# Patient Record
Sex: FEMALE | Race: WHITE | Employment: UNEMPLOYED | URBAN - METROPOLITAN AREA
[De-identification: names, ages, dates, MRNs, and addresses within clinical notes are randomized per-mention and may not be internally consistent; named-entity substitution may affect disease eponyms.]

---

## 2017-10-09 ENCOUNTER — HOSPITAL ENCOUNTER (OUTPATIENT)
Dept: PHYSICAL THERAPY | Age: 62
Setting detail: THERAPIES SERIES
Discharge: HOME | End: 2017-10-09
Payer: COMMERCIAL

## 2017-10-09 PROCEDURE — 97162 PT EVAL MOD COMPLEX 30 MIN: CPT

## 2017-10-09 PROCEDURE — G0283 ELEC STIM OTHER THAN WOUND: HCPCS

## 2017-10-09 PROCEDURE — 97161 PT EVAL LOW COMPLEX 20 MIN: CPT

## 2017-10-09 PROCEDURE — 97140 MANUAL THERAPY 1/> REGIONS: CPT

## 2017-10-09 NOTE — PROGRESS NOTES
[address]  PHYSICAL THERAPY EVALUATION    Date: 10/9/2017  Patient Name: Genie Nevarez       MRN: [id number]   Account: [de-identified]   : 1955  (63 y.o.)   Gender: female   Referring Practitioner: LESLI Chandra                 Diagnosis: Pain in left shoulder; Other chronic pain  Treatment Diagnosis: L shoulder pain                Past Medical History:  has no past medical history on file. Past Surgical History:   has no past surgical history on file. Lives With: Alone  ADL Assistance: Independent  Homemaking Assistance: Independent  Ambulation Assistance: Independent  Transfer Assistance: Independent  Occupation: On disability  Additional Comments: Pt reports increased pain and difficulty with daily activities        Subjective:  Subjective: Pt presemts with 6 week history of left shoulder pain of insidious onset. Had Xray that was negative for any significant pathology. Had trigger point injections and steriod injection, which helped some, although Pt still reports pain limiting function. Pt reports pain in posterior shoulder and upper trap, described as \"pain\". Pain is 3-4/10 at best and 7/10 with aggravting activities. Pain is increased with lifting and household duties. Pain is relieved with sitting, leaning against a pillow, massage.         Objective:                                     Strength RUE  Strength RUE: WFL  Strength LUE  Comment: flex 4-/5, ABD 3+/5, ER 3+/5, IR 4-/5  Strength Other  Other: unable to assume testing position to assess periscapular strength, assume limitations                       AROM RUE (degrees)  RUE AROM : WFL  AROM LUE (degrees)  LUE AROM : WFL  LUE General AROM: end range pain with all mvmts         Observation/Palpation  Palpation: PTP medial border L scapula, L UT, L subacromial space, L deltoid; trigger points noted in L periscapulars, UT  Observation: rounded shoulders, forward head Education Provided: POC, HEP    POST-PAIN     Pain Rating (0-10 pain scale): 4/10  Location and pain description same as pre-treatment unless indicated. Action: [] NA  [] Call Physician  [x] Perform HEP, encouraged use of heat  [] Meds as prescribed    Evaluation and patient rights have been reviewed and patient agrees with plan of care. Yes  [x]  No  []   Explain:       [place] Fall Risk Assessment  Risk Factor Scale  Score   History of Falls [] Yes  [x] No 25  0 0   Secondary Diagnosis [] Yes  [x] No 15  0 0   Ambulatory Aid [] Furniture  [] Crutches/cane/walker  [x] None/bedrest/wheelchair/nurse 30  15  0 0   IV/Heparin Lock [] Yes  [x] No 20  0 0   Gait/Transferring [] Impaired  [] Weak  [x] Normal/bedrest/immobile 20  10  0 0   Mental Status [] Forgets limitations  [x] Oriented to own ability 15  0 0      Total:0     Based on the Assessment score: check the appropriate box.   [x]  No intervention needed   Low =   Score of 0-24  []  Use standard prevention interventions Moderate =  Score of 24-44   [] Discuss fall prevention strategies   [] Indicate moderate falls risk on eval  []  Use high risk prevention interventions High = Score of 45 and higher   [] Discuss fall prevention strategies   [] Provide supervision during treatment time    Goals  Short term goals  Time Frame for Short term goals: \"feel better\"  Short term goal 1: Pt will be indep with HEP  Short term goal 2: Pt will demonstrate pain free L shoulder ROM for improved ADL completion  Short term goal 3: Pt will demonstrate 4+/5 strength in L UE for imporved activity tolerance   Short term goal 4: UEFI will improve by 10 points to demonstrate improved L UE function    Treatment Initiated : HEP, manual, estim and HP    PT Individual Minutes  Time In: 1350  Time Out: [address] [address]  Minutes: 58  Timed Code Treatment Minutes: 12 Minutes  Procedure Minutes: 46  Electronically signed by Jhon Mix PT on 10/9/17 at 2:00 PM

## 2017-10-09 NOTE — PLAN OF CARE
Re-education, Manual Therapy - Soft Tissue Mobilization, Pain Management, Home Exercise Program, Patient/Caregiver Education & Training, Equipment Evaluation, Education, & procurement, Modalities  Plan Comment: Transfer POC to Joni Harris PT        Patient Status: Continue/ Initiate plan of Care      Signature: Electronically signed by Stiven Munson PT on 10/9/17 at 2:56 PM  If you have any questions or concerns, please don't hesitate to call. Thank you for your referral.    I have reviewed this plan of care and certify a need for medically necessary rehabilitation services.     Physician Signature:__________________________________________________________  Date:  Please sign and return

## 2017-10-11 ENCOUNTER — HOSPITAL ENCOUNTER (OUTPATIENT)
Dept: PHYSICAL THERAPY | Age: 62
Setting detail: THERAPIES SERIES
Discharge: HOME | End: 2017-10-11
Payer: COMMERCIAL

## 2017-10-16 ENCOUNTER — HOSPITAL ENCOUNTER (OUTPATIENT)
Dept: PHYSICAL THERAPY | Age: 62
Setting detail: THERAPIES SERIES
Discharge: HOME | End: 2017-10-16
Payer: COMMERCIAL

## 2017-10-16 PROCEDURE — 97110 THERAPEUTIC EXERCISES: CPT

## 2017-10-16 PROCEDURE — 97140 MANUAL THERAPY 1/> REGIONS: CPT

## 2017-10-16 ASSESSMENT — PAIN DESCRIPTION - LOCATION: LOCATION: SHOULDER

## 2017-10-16 ASSESSMENT — PAIN DESCRIPTION - PAIN TYPE: TYPE: CHRONIC PAIN

## 2017-10-16 ASSESSMENT — PAIN SCALES - GENERAL: PAINLEVEL_OUTOF10: 5

## 2017-10-16 ASSESSMENT — PAIN DESCRIPTION - ORIENTATION: ORIENTATION: LEFT

## 2017-10-16 ASSESSMENT — PAIN DESCRIPTION - DESCRIPTORS: DESCRIPTORS: ACHING

## 2017-10-18 ENCOUNTER — HOSPITAL ENCOUNTER (OUTPATIENT)
Dept: PHYSICAL THERAPY | Age: 62
Setting detail: THERAPIES SERIES
Discharge: HOME | End: 2017-10-18
Payer: COMMERCIAL

## 2017-10-18 PROCEDURE — 97140 MANUAL THERAPY 1/> REGIONS: CPT

## 2017-10-18 PROCEDURE — 97110 THERAPEUTIC EXERCISES: CPT

## 2017-10-18 ASSESSMENT — PAIN DESCRIPTION - DESCRIPTORS: DESCRIPTORS: ACHING

## 2017-10-18 ASSESSMENT — PAIN DESCRIPTION - PROGRESSION: CLINICAL_PROGRESSION: GRADUALLY IMPROVING

## 2017-10-18 ASSESSMENT — PAIN SCALES - GENERAL: PAINLEVEL_OUTOF10: 4

## 2017-10-18 ASSESSMENT — PAIN DESCRIPTION - ORIENTATION: ORIENTATION: LEFT

## 2017-10-18 ASSESSMENT — PAIN DESCRIPTION - PAIN TYPE: TYPE: CHRONIC PAIN

## 2017-10-18 ASSESSMENT — PAIN DESCRIPTION - LOCATION: LOCATION: SHOULDER

## 2017-10-23 ENCOUNTER — HOSPITAL ENCOUNTER (OUTPATIENT)
Dept: PHYSICAL THERAPY | Age: 62
Setting detail: THERAPIES SERIES
Discharge: HOME | End: 2017-10-23
Payer: COMMERCIAL

## 2017-10-23 PROCEDURE — 97140 MANUAL THERAPY 1/> REGIONS: CPT

## 2017-10-23 PROCEDURE — 97110 THERAPEUTIC EXERCISES: CPT

## 2017-10-23 ASSESSMENT — PAIN DESCRIPTION - DESCRIPTORS: DESCRIPTORS: ACHING

## 2017-10-23 ASSESSMENT — PAIN DESCRIPTION - PAIN TYPE: TYPE: CHRONIC PAIN

## 2017-10-23 ASSESSMENT — PAIN DESCRIPTION - ORIENTATION: ORIENTATION: LEFT

## 2017-10-23 ASSESSMENT — PAIN DESCRIPTION - PROGRESSION: CLINICAL_PROGRESSION: GRADUALLY IMPROVING

## 2017-10-23 ASSESSMENT — PAIN SCALES - GENERAL: PAINLEVEL_OUTOF10: 5

## 2017-10-23 ASSESSMENT — PAIN DESCRIPTION - LOCATION: LOCATION: SHOULDER

## 2017-10-23 NOTE — PROGRESS NOTES
[address] [address]  Outpatient Physical Therapy    Treatment Note        Date: 10/23/2017  Patient: Padilla Beaver  : 1955  ACCT #: [de-identified]  Referring Practitioner: LESLI Hammer  Diagnosis: Pain in left shoulder; Other chronic pain    Visit Information:  PT Visit Information  PT Insurance Information: [place]  Total # of Visits Approved: 17  Total # of Visits to Date: 4  No Show: 0  Canceled Appointment: 1  Progress Note Counter:     Subjective: \"It's sore today\" Pt reports that she may have overdone it over the weekend     HEP Compliance:  [x] Good [] Fair [] Poor [] Reports not doing due to:    Vital Signs  Patient Currently in Pain: Yes   Pain Screening  Patient Currently in Pain: Yes  Pain Assessment  Pain Level: 5  Pain Type: Chronic pain  Pain Location: Shoulder  Pain Orientation: Left  Pain Descriptors: Aching  Clinical Progression: Gradually improving    OBJECTIVE:   Exercises  Exercise 1: wall slides flex and ABD 5s x 10  Exercise 3: TB IR/ER YTB x 15  Exercise 4: chest pulls YTB x 12 neutral and diagonals  Exercise 5: rows/lats YTB x 15  Exercise 6: prone scap over Pball rows, LT lev 2,  horizontal ABD with IR/ER x10  Exercise 7: pulleys to increase ROM and decrease mm tightness x 4 min  Exercise 8: 3 pt touch 2# x5  Exercise 9: flex/ABD to 90 degrees with 1# wt x10  Exercise 10: B ER YTB x15  Exercise 11: cervical retraction 5s x15  Exercise 12: scap retraction 5s x 10       Strength: [] NT  [x] MMT completed:          Strength LUE  Comment: flex 4/5; ABD 4-/5       ROM: [x] NT  [] ROM measurements:        Manual:   Manual therapy  Soft Tissue Mobalization: STM and TPR L periscapulars, UT, levator x8 min pt seated for comfort    Modalities:  Modalities  Moist heat: to L shoulder/UT x 10 min        Assessment:   Activity Tolerance  Activity Tolerance: Patient Tolerated treatment well  Activity Tolerance:       Body structures, Functions, Activity limitations: Decreased ROM, Decreased strength, Decreased high-level IADLs  Assessment: Good tolerance to added resistance and increased reps this date. No c/o increased pain with any ex although challenged with prone scap stability over Pball. Conclude with HP for pain and mm tightness  Treatment Diagnosis: L shoulder pain  Prognosis: Good      Goals:  Short term goals  Time Frame for Short term goals: \"feel better\"  Short term goal 1: Pt will be indep with HEP  Short term goal 2: Pt will demonstrate pain free L shoulder ROM for improved ADL completion  Short term goal 3: Pt will demonstrate 4+/5 strength in L UE for imporved activity tolerance   Short term goal 4: UEFI will improve by 10 points to demonstrate improved L UE function       Progress toward goals: steady; good progress toward strength goal    POST-PAIN       Pain Rating (0-10 pain scale):   3-4/10   Location and pain description same as pre-treatment unless indicated. Action: [] NA   [x] Perform HEP  [] Meds as prescribed  [x] Modalities as prescribed   [] Call Physician     Frequency/Duration:  Plan  Times per week: 2-3  Plan weeks: 4  Current Treatment Recommendations: Strengthening, ROM, ADL/Self-care Training, IADL Training, Neuromuscular Re-education, Manual Therapy - Soft Tissue Mobilization, Pain Management, Home Exercise Program, Patient/Caregiver Education & Training, Equipment Evaluation, Education, & procurement, Modalities  Plan Comment: Transfer POC to Raman Quinones PT     Pt to continue current HEP. See objective section for any therapeutic exercise changes, additions or modifications this date.         PT Individual Minutes  Time In: 0746  Time Out: 1508  Minutes: 48  Timed Code Treatment Minutes: 38 Minutes  Procedure Minutes: 10    Signature:  Electronically signed by Zhane Donaldson PTA on 10/23/17 at 3:00 PM

## 2017-10-25 ENCOUNTER — APPOINTMENT (OUTPATIENT)
Dept: PHYSICAL THERAPY | Age: 62
End: 2017-10-25
Payer: COMMERCIAL

## 2017-10-30 ENCOUNTER — HOSPITAL ENCOUNTER (OUTPATIENT)
Dept: PHYSICAL THERAPY | Age: 62
Setting detail: THERAPIES SERIES
Discharge: HOME | End: 2017-10-30
Payer: COMMERCIAL

## 2017-10-30 PROCEDURE — 97110 THERAPEUTIC EXERCISES: CPT

## 2017-10-30 PROCEDURE — 97140 MANUAL THERAPY 1/> REGIONS: CPT

## 2017-10-30 ASSESSMENT — PAIN DESCRIPTION - PROGRESSION: CLINICAL_PROGRESSION: GRADUALLY IMPROVING

## 2017-10-30 ASSESSMENT — PAIN DESCRIPTION - LOCATION: LOCATION: SHOULDER

## 2017-10-30 ASSESSMENT — PAIN DESCRIPTION - PAIN TYPE: TYPE: CHRONIC PAIN

## 2017-10-30 ASSESSMENT — PAIN DESCRIPTION - DESCRIPTORS: DESCRIPTORS: ACHING

## 2017-10-30 ASSESSMENT — PAIN SCALES - GENERAL: PAINLEVEL_OUTOF10: 5

## 2017-10-30 ASSESSMENT — PAIN DESCRIPTION - ORIENTATION: ORIENTATION: LEFT

## 2017-10-30 NOTE — PROGRESS NOTES
[address] [address]  Outpatient Physical Therapy    Treatment Note        Date: 10/30/2017  Patient: Navin Ramon  : 1955  ACCT #: [de-identified]  Referring Practitioner: LESLI Trujillo  Diagnosis: Pain in left shoulder; Other chronic pain    Visit Information:  PT Visit Information  PT Insurance Information: [employer]  Total # of Visits Approved: 17  Total # of Visits to Date: 5  No Show: 0  Canceled Appointment: 1  Progress Note Counter: -    Subjective: \"It was about a -6/10 all weekend; maybe the cold weather\"     HEP Compliance:  [x] Good [] Fair [] Poor [] Reports not doing due to:    Vital Signs  Patient Currently in Pain: Yes   Pain Screening  Patient Currently in Pain: Yes  Pain Assessment  Pain Level: 5  Pain Type: Chronic pain  Pain Location: Shoulder  Pain Orientation: Left  Pain Descriptors: Aching  Clinical Progression: Gradually improving    OBJECTIVE:   Exercises  Exercise 3: TB IR/ER RTB x 10  Exercise 4: chest pulls YTB x 12 neutral and diagonals  Exercise 5: rows/lats RTB x 10  Exercise 6: prone scap over Pball rows, LT lev 2,  horizontal ABD with IR/ER x10  Exercise 7: pulleys to increase ROM and decrease mm tightness x 4 min  Exercise 8: 3 pt touch 2# x7  Exercise 9: flex/ABD to 90 degrees with 1# wt x12  Exercise 10:  B ER YTB x15  Exercise 11: cervical retraction 5s x15  Exercise 12: scap retraction 5s x 10  Exercise 13: wall push ups x 10       Strength: [x] NT  [] MMT completed:        ROM: [x] NT  [] ROM measurements:          Manual:   Manual therapy  Soft Tissue Mobalization: STM and TPR L periscapulars, UT, levator x12 min pt seated for comfort    Modalities:  Modalities  Moist heat: to L shoulder/UT x 10 min       Assessment:   Activity Tolerance  Activity Tolerance: Patient Tolerated treatment well    Body structures, Functions, Activity limitations: Decreased ROM, Decreased strength, Decreased high-level IADLs  Assessment: Pt tolerated progressions well

## 2017-11-01 ENCOUNTER — HOSPITAL ENCOUNTER (OUTPATIENT)
Dept: PHYSICAL THERAPY | Age: 62
Setting detail: THERAPIES SERIES
Discharge: HOME | End: 2017-11-01
Payer: COMMERCIAL

## 2017-11-01 PROCEDURE — 97140 MANUAL THERAPY 1/> REGIONS: CPT

## 2017-11-01 PROCEDURE — 97110 THERAPEUTIC EXERCISES: CPT

## 2017-11-01 ASSESSMENT — PAIN DESCRIPTION - LOCATION: LOCATION: SHOULDER

## 2017-11-01 ASSESSMENT — PAIN DESCRIPTION - DESCRIPTORS: DESCRIPTORS: ACHING

## 2017-11-01 ASSESSMENT — PAIN DESCRIPTION - ORIENTATION: ORIENTATION: LEFT

## 2017-11-01 ASSESSMENT — PAIN DESCRIPTION - PAIN TYPE: TYPE: CHRONIC PAIN

## 2017-11-01 ASSESSMENT — PAIN SCALES - GENERAL: PAINLEVEL_OUTOF10: 5

## 2017-11-01 ASSESSMENT — PAIN DESCRIPTION - PROGRESSION: CLINICAL_PROGRESSION: GRADUALLY IMPROVING

## 2017-11-06 ENCOUNTER — HOSPITAL ENCOUNTER (OUTPATIENT)
Dept: PHYSICAL THERAPY | Age: 62
Setting detail: THERAPIES SERIES
Discharge: HOME | End: 2017-11-06
Payer: COMMERCIAL

## 2017-11-06 NOTE — PROGRESS NOTES
[address]       Physical Therapy  Cancellation/No-show Note  Patient Name:  Naila Raphael  :  1955   Date:  2017  Referring Practitioner: LESLI Glez  Diagnosis: Pain in left shoulder;  Other chronic pain    Visit Information:  PT Visit Information  PT Insurance Information: [place]  Total # of Visits Approved: 17  Total # of Visits to Date: 6  No Show: 0  Canceled Appointment: 2  Progress Note Counter: - (CX 17)     For today's appointment patient:  [x]  Cancelled  []  Rescheduled appointment  []  No-show   []  Called pt to remind of next appointment     Reason given by patient:  [x]  Patient ill  []  Conflicting appointment  []  No transportation    []  Conflict with work  []  No reason given  []  Other:      Signature: Electronically signed by Silke Jara PTA on 17 at 1:56 PM

## 2017-11-27 ENCOUNTER — HOSPITAL ENCOUNTER (OUTPATIENT)
Dept: PHYSICAL THERAPY | Age: 62
Setting detail: THERAPIES SERIES
Discharge: HOME | End: 2017-11-27
Payer: COMMERCIAL

## 2017-11-27 PROCEDURE — 97110 THERAPEUTIC EXERCISES: CPT

## 2017-11-27 PROCEDURE — 97140 MANUAL THERAPY 1/> REGIONS: CPT

## 2017-11-27 ASSESSMENT — PAIN DESCRIPTION - PAIN TYPE: TYPE: CHRONIC PAIN

## 2017-11-27 ASSESSMENT — PAIN DESCRIPTION - ORIENTATION: ORIENTATION: LEFT

## 2017-11-27 ASSESSMENT — PAIN DESCRIPTION - LOCATION: LOCATION: SHOULDER

## 2017-11-27 ASSESSMENT — PAIN DESCRIPTION - DESCRIPTORS: DESCRIPTORS: ACHING

## 2017-11-27 ASSESSMENT — PAIN SCALES - GENERAL: PAINLEVEL_OUTOF10: 5

## 2017-11-27 NOTE — PROGRESS NOTES
[address] [address]  Outpatient Physical Therapy    Treatment Note        Date: 2017  Patient: Daphne Graham  : 1955  ACCT #: [de-identified]  Referring Practitioner: LESLI Pereyra  Diagnosis: Pain in left shoulder; Other chronic pain    Visit Information:  PT Visit Information  PT Insurance Information: [place]  Total # of Visits Approved: 17  Total # of Visits to Date: 7  No Show: 1  Canceled Appointment: 2  Progress Note Counter:     Subjective: Pt reports that she is sore today     HEP Compliance:  [x] Good [] Fair [] Poor [] Reports not doing due to:    Vital Signs  Patient Currently in Pain: Yes   Pain Screening  Patient Currently in Pain: Yes  Pain Assessment  Pain Level: 5  Pain Type: Chronic pain  Pain Location: Shoulder  Pain Orientation: Left  Pain Descriptors: Aching    OBJECTIVE:   Exercises  Exercise 2: corner stretch 20s x 3 (doorway for HEP)  Exercise 3: TB IR/ER RTB x 15  Exercise 4: chest pulls YTB x 15 neutral and diagonals  Exercise 5: rows/lats RTB x 15  Exercise 6: prone scap over Pball rows, LT lev 2,  horizontal ABD with IR/ER x12  Exercise 8: 3 pt touch 2# x10  Exercise 9: flex/ABD to 90 degrees with 2# wt x 10  Exercise 10:  B ER RTB x 12  Exercise 11: cervical retraction 5s x15  Exercise 12: scap retraction 5s x 12  Exercise 13: wall push ups x 10  Exercise 14: UBE level 1 x 4 min (2F/2R)  Exercise 15: L UT stretch 30s x 3         Strength: [] NT  [x] MMT completed:       Strength LUE  Comment: flex & ABD 4/5; IR/ER 4+/5       ROM: [x] NT  [] ROM measurements:       Manual:   Manual therapy  Soft Tissue Mobalization: STM and TPR L periscapulars, UT, levator x10 min pt seated for comfort    Modalities:  Modalities  Moist heat: to L shoulder/UT x 10 min     *Indicates exercise, modality, or manual techniques to be initiated when appropriate    Assessment:   Activity Tolerance  Activity Tolerance: Patient Tolerated treatment well    Body structures, Functions,

## 2017-11-29 ENCOUNTER — HOSPITAL ENCOUNTER (OUTPATIENT)
Dept: PHYSICAL THERAPY | Age: 62
Setting detail: THERAPIES SERIES
Discharge: HOME | End: 2017-11-29
Payer: COMMERCIAL

## 2019-08-11 NOTE — PROGRESS NOTES
[address] [address]  Outpatient Physical Therapy    Treatment Note        Date: 2017  Patient: Dilip Velazquez  : 1955  ACCT #: [de-identified]  Referring Practitioner: LESLI Marie  Diagnosis: Pain in left shoulder; Other chronic pain    Visit Information:  PT Visit Information  PT Insurance Information: [place]  Total # of Visits Approved: 17  Total # of Visits to Date: 6  No Show: 0  Canceled Appointment: 1  Progress Note Counter:     Subjective:  Pt reports that she is in quite a bit of pain today     HEP Compliance:  [x] Good [] Fair [] Poor [] Reports not doing due to:    Vital Signs  Patient Currently in Pain: Yes   Pain Screening  Patient Currently in Pain: Yes  Pain Assessment  Pain Level: 5  Pain Type: Chronic pain  Pain Location: Shoulder  Pain Orientation: Left  Pain Descriptors: Aching  Clinical Progression: Gradually improving    OBJECTIVE:   Exercises  Exercise 1: wall slides flex and ABD 5s x 10  Exercise 2: corner stretch 20s x 3 (doorway for HEP)  Exercise 3: TB IR/ER RTB x 15  Exercise 4: chest pulls YTB x 15 neutral and diagonals  Exercise 5: rows/lats RTB x 15  Exercise 6: prone scap over Pball rows, LT lev 2,  horizontal ABD with IR/ER x12  Exercise 8: 3 pt touch 2# x10  Exercise 9: flex/ABD to 90 degrees with 2# wt x 10  Exercise 10:  B ER RTB x 10  Exercise 11: cervical retraction 5s x15  Exercise 12: scap retraction 5s x 12  Exercise 13: wall push ups x 10  Exercise 14: UBE level 1 x 4 min (2F/2R)  Exercise 15: L UT stretch 20s x 3         Strength: [x] NT  [] MMT completed:        ROM: [x] NT  [] ROM measurements:             Manual:   Manual therapy  Soft Tissue Mobalization: STM and TPR L periscapulars, UT, levator x10 min pt seated for comfort    Modalities:  Modalities  Moist heat: to L shoulder/UT x 10 min      Assessment:   Activity Tolerance  Activity Tolerance: Patient Tolerated treatment well    Body structures, Functions, Activity limitations: Decreased ROM, Decreased strength, Decreased high-level IADLs  Assessment: Pt tolerated new additions and progressions in reps and resistance well with improved technique. Endurance improving with pt tolerating all ex without rest breaks  Treatment Diagnosis: L shoulder pain  Prognosis: Good      Goals:  Short term goals  Time Frame for Short term goals: \"feel better\"  Short term goal 1: Pt will be indep with HEP  Short term goal 2: Pt will demonstrate pain free L shoulder ROM for improved ADL completion  Short term goal 3: Pt will demonstrate 4+/5 strength in L UE for imporved activity tolerance   Short term goal 4: UEFI will improve by 10 points to demonstrate improved L UE function   Progress toward goals: steady progress    POST-PAIN       Pain Rating (0-10 pain scale):  4 /10   Location and pain description same as pre-treatment unless indicated. Action: [] NA   [x] Perform HEP  [] Meds as prescribed  [x] Modalities as prescribed   [] Call Physician     Frequency/Duration:  Plan  Times per week: 2-3  Plan weeks: 4  Current Treatment Recommendations: Strengthening, ROM, ADL/Self-care Training, IADL Training, Neuromuscular Re-education, Manual Therapy - Soft Tissue Mobilization, Pain Management, Home Exercise Program, Patient/Caregiver Education & Training, Equipment Evaluation, Education, & procurement, Modalities  Plan Comment: Transfer POC to [facility], PT     Pt to continue current HEP. See objective section for any therapeutic exercise changes, additions or modifications this date.         PT Individual Minutes  Time In: 6366  Time Out: 1508  Minutes: 48  Timed Code Treatment Minutes: 38 Minutes  Procedure Minutes: 10    Signature:  Electronically signed by Kimberley Arredondo PTA on 11/1/17 at 3:00 PM abd pain   elbow pain